# Patient Record
Sex: MALE | Race: WHITE | URBAN - METROPOLITAN AREA
[De-identification: names, ages, dates, MRNs, and addresses within clinical notes are randomized per-mention and may not be internally consistent; named-entity substitution may affect disease eponyms.]

---

## 2022-09-07 ENCOUNTER — OFFICE VISIT (OUTPATIENT)
Dept: PSYCHIATRY | Facility: CLINIC | Age: 22
End: 2022-09-07
Payer: MEDICAID

## 2022-09-07 ENCOUNTER — PATIENT MESSAGE (OUTPATIENT)
Dept: PSYCHIATRY | Facility: CLINIC | Age: 22
End: 2022-09-07
Payer: MEDICAID

## 2022-09-07 DIAGNOSIS — F90.0 ATTENTION DEFICIT HYPERACTIVITY DISORDER (ADHD), PREDOMINANTLY INATTENTIVE TYPE: Primary | ICD-10-CM

## 2022-09-07 PROCEDURE — 1159F MED LIST DOCD IN RCRD: CPT | Mod: SA,HB,CPTII,95 | Performed by: NURSE PRACTITIONER

## 2022-09-07 PROCEDURE — 90792 PR PSYCHIATRIC DIAGNOSTIC EVALUATION W/MEDICAL SERVICES: ICD-10-PCS | Mod: SA,HB,95, | Performed by: NURSE PRACTITIONER

## 2022-09-07 PROCEDURE — 90792 PSYCH DIAG EVAL W/MED SRVCS: CPT | Mod: SA,HB,95, | Performed by: NURSE PRACTITIONER

## 2022-09-07 PROCEDURE — 1159F PR MEDICATION LIST DOCUMENTED IN MEDICAL RECORD: ICD-10-PCS | Mod: SA,HB,CPTII,95 | Performed by: NURSE PRACTITIONER

## 2022-09-07 PROCEDURE — 1160F RVW MEDS BY RX/DR IN RCRD: CPT | Mod: SA,HB,CPTII,95 | Performed by: NURSE PRACTITIONER

## 2022-09-07 PROCEDURE — 1160F PR REVIEW ALL MEDS BY PRESCRIBER/CLIN PHARMACIST DOCUMENTED: ICD-10-PCS | Mod: SA,HB,CPTII,95 | Performed by: NURSE PRACTITIONER

## 2022-09-07 RX ORDER — LISDEXAMFETAMINE DIMESYLATE 40 MG/1
40 CAPSULE ORAL DAILY
Qty: 30 CAPSULE | Refills: 0 | Status: SHIPPED | OUTPATIENT
Start: 2022-09-07 | End: 2022-09-12 | Stop reason: SDUPTHER

## 2022-09-07 RX ORDER — DEXTROAMPHETAMINE SACCHARATE, AMPHETAMINE ASPARTATE, DEXTROAMPHETAMINE SULFATE AND AMPHETAMINE SULFATE 2.5; 2.5; 2.5; 2.5 MG/1; MG/1; MG/1; MG/1
10 TABLET ORAL
Qty: 30 TABLET | Refills: 0 | Status: SHIPPED | OUTPATIENT
Start: 2022-09-07 | End: 2022-09-12 | Stop reason: SDUPTHER

## 2022-09-07 NOTE — PROGRESS NOTES
Outpatient Psychiatry Initial Visit (MD/NP)    9/7/2022    The patient location is: [place]   The chief complaint leading to consultation is: ADHD    Visit type: audiovisual    Face to Face time with patient: 40 minutes  42 minutes of total time spent on the encounter, which includes face to face time and non-face to face time preparing to see the patient (eg, review of tests), Obtaining and/or reviewing separately obtained history, Documenting clinical information in the electronic or other health record, Independently interpreting results (not separately reported) and communicating results to the patient/family/caregiver, or Care coordination (not separately reported).       Each patient to whom he or she provides medical services by telemedicine is:  (1) informed of the relationship between the physician and patient and the respective role of any other health care provider with respect to management of the patient; and (2) notified that he or she may decline to receive medical services by telemedicine and may withdraw from such care at any time.    Robert Martinez, a 21 y.o. male, presenting for initial evaluation visit. Met with patient.    Reason for Encounter: self-referral. Patient complains of ADHD      History of Present Illness: ADHD  Robert Martinez checked in on time for his appointment. Presents to establish care with a new provider to continue medication management of ADHD. Diagnosed around 18 years of age- managed on Vyvanse and Adderall. He recalls sitting in class doing anything but focusing- looking around, tapping his foot.     Symptoms of ADHD:   DSM-V ADHD Criteria:     Inattentive symptoms  Often fails to give close attention to details or makes careless mistakes in schoolwork, at work, or during other activities (e.g., overlooks or misses details, work is inaccurate). No  Often has difficulty sustaining attention in tasks or play activities (e.g., has difficulty remaining focused during  lectures, conversations, or lengthy reading). Yes- cannot pay attention to take notes,   Often does not seem to listen when spoken to directly (e.g., mind seems elsewhere, even in the absence of any obvious distraction). No  Often does not follow through on instructions and fails to finish schoolwork, chores, or duties in the workplace (e.g., starts tasks but quickly loses focus and is easily sidetracked). Yes- will play on his phone when he should be studying  Often has difficulty organizing tasks and activities (e.g., difficulty managing sequential tasks; difficulty keeping materials and belongings in order; messy, disorganized work; has poor time management; fails to meet deadlines). Yes  Often avoids, dislikes, or is reluctant to engage in tasks that require sustained mental effort (e.g., schoolwork or homework; for older adolescents and adults, preparing reports, completing forms, reviewing lengthy papers). Yes  Often loses things necessary for tasks or activities (e.g., school materials, pencils, books, tools, wallets, keys, papenwork, eyeglasses, mobile telephones). No  Is often easily distracted by extraneous stimuli (for older adolescents and adults, may include unrelated thoughts). Yes  Is often forgetful in daily activities (e.g., doing chores, running errands; for older adolescents and adults, returning calls, paying bills, keeping appointments). No     Hyperactivity symptoms:  Often fidgets with or taps hands or feet or squirms in seat. Yes - tapping foot  Often leaves seat in situations when remaining seated is expected (e.g., leaves his or her place in the classroom, in the office or other workplace, or in other situations that require remaining in place). Yes- impossible to sit still for online courses  Often runs about or climbs in situations where it is inappropriate. (Note: In adolescents or adults, may be limited to feeling restless). No  Often unable to play or engage in leisure activities  quietly. Yes  Is often on the go, acting as if driven by a motor (e.g., is unable to be or uncomfortable being still for extended time, as in restaurants, meetings; may be experienced by others as being restless or difficult to keep up with). Yes  Often talks excessively. No  Often blurts out an answer before a question has been completed (e.g., completes peoples sentences; cannot wait for turn in conversation). No  Often has difficulty waiting his or her turn (e.g., while waiting in line). Yes  Often interrupts or intrudes on others (e.g., butts into conversations, games, or activities; may start using other peoples things without asking or receiving permission; for adolescents and adults, may intrude into or take over what others are doing). 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[employer]\""  Other pertinent history: None    Substance Abuse History:  Recreational drugs:  denies  Use of alcohol: occasional, social use  Use of caffeine: coffee 1 /day  Tobacco use: no  Legal consequences of chemical use: no  Patient feels he ought to cut down on drinking and/or drug use: no  Patient has been annoyed by others criticizing his drinking or drug use: no  Patient has felt bad or guilty about drinking or drug use:no  Patient has had a drink or used drugs as an eye opener first thing in the morning to steady nerves, get rid of a hangover or get the day started: no  Use of OTC medications: nothing regular    Additional historical information includes:   Seizure: denies  Head trauma/TBI:denies    The following portions of the patient's history were reviewed and updated as appropriate: allergies, current medications, past family history, past  medical history, past social history, past surgical history, and problem list.      Review Of Systems:     Medical Review Of Systems:  Constitutional: negative  Respiratory: negative  Cardiovascular: negative  Gastrointestinal: negative  Musculoskeletal:negative  Neurological: negative    Psychiatric Review Of Systems:  Sleep: yes, trouble falling asleep  Appetite changes: no  Weight changes: no  Energy: no  Interest/pleasure/anhedonia: no  Somatic symptoms: no  Libido: no  Anxiety/panic: yes  Guilty/hopeless: no  Self-injurious behavior/risky behavior: no  Any drugs: no  Alcohol: no       Current Evaluation:     Musculoskeletal  Muscle Strength/Tone:  no tremor, no tic per paitent   Gait & Station:  MARIBEL due to video visit       Relevant Elements of Neurological Exam: MARIBEL due to video visit     Nutritional Screening: Considering the patient's height and weight, medications, medical history and preferences, should a referral be made to the dietitian? no    Mental Status Evaluation:  Appearance:  unremarkable, age appropriate   Behavior:  normal, cooperative   Speech:  no latency; no press   Mood:  steady   Affect:  congruent and appropriate   Thought Process:  normal and logical   Thought Content:  normal, no suicidality, no homicidality, delusions, or paranoia   Sensorium:  grossly intact   Cognition:  grossly intact   Insight:  intact   Judgment:  behavior is adequate to circumstances     Physical/Somatic Complaints   The patient lists: no physical complaints.    Functioning in Relationships:  Spouse/partner: good  Peers: good  Employers: [employer]    Constitutional  Vitals:    There were no vitals filed for this visit.  Patient to check his BP at home and message the results     General:  unremarkable, age appropriate       Laboratory Data  No visits with results within 1 Month(s) from this visit.   Latest known visit with results is:   No results found for any previous visit.          Medications  Outpatient Encounter Medications as of 9/7/2022   Medication Sig Dispense Refill    dextroamphetamine-amphetamine 10 mg Tab Take 1 tablet (10 mg total) by mouth after lunch. 30 tablet 0    lisdexamfetamine (VYVANSE) 40 MG Cap Take 1 capsule (40 mg total) by mouth once daily. 30 capsule 0     No facility-administered encounter medications on file as of 9/7/2022.           Assessment - Diagnosis - Goals:     Impression: Robert Martinez, a 21 y.o. male, presenting for initial evaluation visit to continue medication management of ADHD symptoms. Currently stable on Vyvanse and prn Adderall.       ICD-10-CM ICD-9-CM   1. Attention deficit hyperactivity disorder (ADHD), predominantly inattentive type  F90.0 314.00       Strengths and Liabilities: Strength: Patient accepts guidance/feedback, Strength: Patient is expressive/articulate., Strength: Patient is intelligent., Liability: Patient lacks coping skills.    Treatment Goals:    ADHD: complete work/school assignments on time, stay on task without frequent reminders, less frequent interruptions of others, remain focused during conversations with others as evidenced by self-report and observation      Treatment Plan/Recommendations:   Medication Management: Continue current medications.   Vyvanse 40 mg daily  Adderall IR 10 mg after lunch  Patient has no contraindications: no h/o allergic rxn, agitation, anxiety, tourette's, arrythmia, cardiovascular disease, cardiac structural abnormalities, hyperthyroidism, glaucoma, Other psychiatric illness, etc.   Discussed diagnosis, risks and benefits of proposed treatment vs alternative treatments vs no treatment, and potential side effects of these treatments (death, dependency, psychosis, cande, aggression, HTN, ticks, MI, stroke, arrythmia, seizure, anaphylaxis or other allergic reactions, leukopenia, nervousness, anorexia, insomnia, tachycardia, palpitations, dizziness, BP changes, HR changes, visual  disturbance, etc.). The patient expresses understanding of the above and displays the capacity to agree with this treatment given said understanding. Patient also agrees that, currently, the benefits outweigh the risks and would like to pursue treatment at this time.  The risks and benefits of medication were discussed with the patient.  Labs: UDS ordered  The treatment plan and follow up plan were reviewed with the patient.  Discussed diagnosis, risks and benefits of proposed treatment above vs alternative treatments vs no treatment, and potential side effects of these treatments. The patient expresses understanding of the above and displays the capacity to agree with this treatment given said understanding. Patient also agrees that, currently, the benefits outweigh the risks and would like to pursue treatment at this time.  Discussed inherent unpredictability of medications in each individual.   Encouraged Patient to keep future appointments.   Take medications as prescribed and abstain from substance abuse.   In the event of an emergency patient was advised to go to the emergency room      Return to Clinic: 3 months    Total time: 42 minutes with more than 50% of time spent counseling and/or coordinating care.  (which included pts differential diagnosis and prognosis for psychiatric conditions, risks, benefits of treatments, instructions and adherence to treatment plan, risk reduction, reviewing current psychiatric medication regimen, medical problems and social stressors. In addtion to possible discussion with other healthcare provider/s)    Rebecca Diana  [facility]-BC PMHNP  [facility]

## 2022-09-12 DIAGNOSIS — F90.0 ATTENTION DEFICIT HYPERACTIVITY DISORDER (ADHD), PREDOMINANTLY INATTENTIVE TYPE: ICD-10-CM

## 2022-09-12 RX ORDER — LISDEXAMFETAMINE DIMESYLATE 40 MG/1
40 CAPSULE ORAL DAILY
Qty: 30 CAPSULE | Refills: 0 | Status: SHIPPED | OUTPATIENT
Start: 2022-09-12 | End: 2022-10-20 | Stop reason: SDUPTHER

## 2022-09-12 RX ORDER — DEXTROAMPHETAMINE SACCHARATE, AMPHETAMINE ASPARTATE, DEXTROAMPHETAMINE SULFATE AND AMPHETAMINE SULFATE 2.5; 2.5; 2.5; 2.5 MG/1; MG/1; MG/1; MG/1
10 TABLET ORAL
Qty: 30 TABLET | Refills: 0 | Status: SHIPPED | OUTPATIENT
Start: 2022-09-12 | End: 2022-10-20 | Stop reason: SDUPTHER

## 2022-10-19 ENCOUNTER — PATIENT MESSAGE (OUTPATIENT)
Dept: PSYCHIATRY | Facility: CLINIC | Age: 22
End: 2022-10-19
Payer: MEDICAID

## 2022-10-20 DIAGNOSIS — F90.0 ATTENTION DEFICIT HYPERACTIVITY DISORDER (ADHD), PREDOMINANTLY INATTENTIVE TYPE: Primary | ICD-10-CM

## 2022-10-20 RX ORDER — DEXTROAMPHETAMINE SACCHARATE, AMPHETAMINE ASPARTATE, DEXTROAMPHETAMINE SULFATE AND AMPHETAMINE SULFATE 2.5; 2.5; 2.5; 2.5 MG/1; MG/1; MG/1; MG/1
10 TABLET ORAL
Qty: 30 TABLET | Refills: 0 | Status: SHIPPED | OUTPATIENT
Start: 2022-10-20 | End: 2022-12-01 | Stop reason: SDUPTHER

## 2022-10-20 RX ORDER — DEXTROAMPHETAMINE SACCHARATE, AMPHETAMINE ASPARTATE, DEXTROAMPHETAMINE SULFATE AND AMPHETAMINE SULFATE 2.5; 2.5; 2.5; 2.5 MG/1; MG/1; MG/1; MG/1
10 TABLET ORAL
Qty: 30 TABLET | Refills: 0 | Status: SHIPPED | OUTPATIENT
Start: 2022-11-18 | End: 2022-12-01 | Stop reason: SDUPTHER

## 2022-10-20 RX ORDER — LISDEXAMFETAMINE DIMESYLATE 40 MG/1
40 CAPSULE ORAL DAILY
Qty: 30 CAPSULE | Refills: 0 | Status: SHIPPED | OUTPATIENT
Start: 2022-11-18 | End: 2022-12-01 | Stop reason: SDUPTHER

## 2022-10-20 RX ORDER — LISDEXAMFETAMINE DIMESYLATE 40 MG/1
40 CAPSULE ORAL DAILY
Qty: 30 CAPSULE | Refills: 0 | Status: SHIPPED | OUTPATIENT
Start: 2022-10-20 | End: 2022-12-01 | Stop reason: SDUPTHER

## 2022-12-01 ENCOUNTER — OFFICE VISIT (OUTPATIENT)
Dept: PSYCHIATRY | Facility: CLINIC | Age: 22
End: 2022-12-01
Payer: MEDICAID

## 2022-12-01 DIAGNOSIS — F90.0 ATTENTION DEFICIT HYPERACTIVITY DISORDER (ADHD), PREDOMINANTLY INATTENTIVE TYPE: Primary | ICD-10-CM

## 2022-12-01 PROCEDURE — 1160F RVW MEDS BY RX/DR IN RCRD: CPT | Mod: SA,HB,CPTII,95 | Performed by: NURSE PRACTITIONER

## 2022-12-01 PROCEDURE — 99213 PR OFFICE/OUTPT VISIT, EST, LEVL III, 20-29 MIN: ICD-10-PCS | Mod: SA,HB,95, | Performed by: NURSE PRACTITIONER

## 2022-12-01 PROCEDURE — 1159F PR MEDICATION LIST DOCUMENTED IN MEDICAL RECORD: ICD-10-PCS | Mod: SA,HB,CPTII,95 | Performed by: NURSE PRACTITIONER

## 2022-12-01 PROCEDURE — 99212 OFFICE O/P EST SF 10 MIN: CPT | Performed by: NURSE PRACTITIONER

## 2022-12-01 PROCEDURE — 99213 OFFICE O/P EST LOW 20 MIN: CPT | Mod: SA,HB,95, | Performed by: NURSE PRACTITIONER

## 2022-12-01 PROCEDURE — 1160F PR REVIEW ALL MEDS BY PRESCRIBER/CLIN PHARMACIST DOCUMENTED: ICD-10-PCS | Mod: SA,HB,CPTII,95 | Performed by: NURSE PRACTITIONER

## 2022-12-01 PROCEDURE — 1159F MED LIST DOCD IN RCRD: CPT | Mod: SA,HB,CPTII,95 | Performed by: NURSE PRACTITIONER

## 2022-12-01 RX ORDER — LISDEXAMFETAMINE DIMESYLATE 40 MG/1
40 CAPSULE ORAL DAILY
Qty: 30 CAPSULE | Refills: 0 | Status: SHIPPED | OUTPATIENT
Start: 2022-12-21

## 2022-12-01 RX ORDER — LISDEXAMFETAMINE DIMESYLATE 40 MG/1
40 CAPSULE ORAL DAILY
Qty: 30 CAPSULE | Refills: 0 | Status: SHIPPED | OUTPATIENT
Start: 2023-01-18

## 2022-12-01 RX ORDER — DEXTROAMPHETAMINE SACCHARATE, AMPHETAMINE ASPARTATE, DEXTROAMPHETAMINE SULFATE AND AMPHETAMINE SULFATE 2.5; 2.5; 2.5; 2.5 MG/1; MG/1; MG/1; MG/1
10 TABLET ORAL
Qty: 30 TABLET | Refills: 0 | Status: SHIPPED | OUTPATIENT
Start: 2022-12-21

## 2022-12-01 RX ORDER — LISDEXAMFETAMINE DIMESYLATE 40 MG/1
40 CAPSULE ORAL DAILY
Qty: 30 CAPSULE | Refills: 0 | Status: SHIPPED | OUTPATIENT
Start: 2023-02-15

## 2022-12-01 RX ORDER — DEXTROAMPHETAMINE SACCHARATE, AMPHETAMINE ASPARTATE, DEXTROAMPHETAMINE SULFATE AND AMPHETAMINE SULFATE 2.5; 2.5; 2.5; 2.5 MG/1; MG/1; MG/1; MG/1
10 TABLET ORAL
Qty: 30 TABLET | Refills: 0 | Status: SHIPPED | OUTPATIENT
Start: 2023-01-18

## 2022-12-01 RX ORDER — DEXTROAMPHETAMINE SACCHARATE, AMPHETAMINE ASPARTATE, DEXTROAMPHETAMINE SULFATE AND AMPHETAMINE SULFATE 2.5; 2.5; 2.5; 2.5 MG/1; MG/1; MG/1; MG/1
10 TABLET ORAL
Qty: 30 TABLET | Refills: 0 | Status: SHIPPED | OUTPATIENT
Start: 2023-02-15

## 2022-12-01 NOTE — PROGRESS NOTES
Outpatient Psychiatry Follow-Up Visit (MD/NP)    12/1/2022    The patient location is: [place]  The chief complaint leading to consultation is: ADHD    Visit type: audiovisual    Face to Face time with patient: 5 minutes  9 minutes of total time spent on the encounter, which includes face to face time and non-face to face time preparing to see the patient (eg, review of tests), Obtaining and/or reviewing separately obtained history, Documenting clinical information in the electronic or other health record, Independently interpreting results (not separately reported) and communicating results to the patient/family/caregiver, or Care coordination (not separately reported).     Each patient to whom he or she provides medical services by telemedicine is:  (1) informed of the relationship between the physician and patient and the respective role of any other health care provider with respect to management of the patient; and (2) notified that he or she may decline to receive medical services by telemedicine and may withdraw from such care at any time.    Clinical Status of Patient:  Outpatient (Ambulatory)    Chief Complaint:  Robert Martinez is a 22 y.o. male who presents today for follow-up of attention problems.  Met with patient.      Interval History and Content of Current Session:  Interim Events/Subjective Report/Content of Current Session:   Robert Martinez checked in on time for his virtual visit. At initial visit we continues ADHD treatment. Presents for follow-up. Denies issues with current regimen. He denies any problems with headache, stomach upset, weight loss, insomnia, chest pain, palpitations, tics, or tremors. Denies SI/HI/AVH. No objective s/sx of psychosis or cande. Patient verbalized motivation for compliance with medications and all other elements of treatment plan.           Review of Systems   PSYCHIATRIC: Pertinant items are noted in the narrative.  CONSTITUTIONAL: No weight gain or loss.    MUSCULOSKELETAL: No pain or stiffness of the joints.  RESPIRATORY: No shortness of breath.  CARDIOVASCULAR: No tachycardia or chest pain.  GASTROINTESTINAL: No nausea, vomiting, pain, constipation or diarrhea.    Past Medical, Family and Social History: The patient's past medical, family and social history have been reviewed and updated as appropriate within the electronic medical record - see encounter notes.    Compliance: yes    Side effects: None    Risk Parameters:  Patient reports no suicidal ideation  Patient reports no homicidal ideation  Patient reports no self-injurious behavior  Patient reports no violent behavior    Exam (detailed: at least 9 elements; comprehensive: all 15 elements)   Constitutional  Vitals:    There were no vitals filed for this visit.     General:  unremarkable, age appropriate     Musculoskeletal  Muscle Strength/Tone:  not examined   Gait & Station:  MARIBEL due to video visit      Psychiatric  Speech:  no latency; no press   Mood & Affect:  steady  congruent and appropriate   Thought Process:  normal and logical   Associations:  intact   Thought Content:  normal, no suicidality, no homicidality, delusions, or paranoia   Insight:  intact   Judgement: behavior is adequate to circumstances   Orientation:  grossly intact   Memory: intact for content of interview   Language: grossly intact   Attention Span & Concentration:  able to focus   Fund of Knowledge:  intact and appropriate to age and level of education     Assessment and Diagnosis   Status/Progress: Based on the examination today, the patient's problem(s) is/are well controlled.  New problems have not been presented today.   Co-morbidities, Diagnostic uncertainty, and Lack of compliance are not complicating management of the primary condition.  There are no active rule-out diagnoses for this patient at this time.     General Impression: Robert Martinez, a 22 y.o. male, presenting for follow-up visit to continue medication  management of ADHD symptoms. Currently stable on Vyvanse and prn Adderall. Presents 12/1/22- stable      ICD-10-CM ICD-9-CM   1. Attention deficit hyperactivity disorder (ADHD), predominantly inattentive type  F90.0 314.00       Intervention/Counseling/Treatment Plan   Medication Management: Continue current medications.   Vyvanse 40 mg daily  Adderall IR 10 mg after lunch  Checked [place]  and no irregularities were noted.  The risks and benefits of medication were discussed with the patient.  Discussed diagnosis, risks and benefits of proposed treatment above vs alternative treatments vs no treatment, and potential side effects of these treatments. The patient expresses understanding of the above and displays the capacity to agree with this treatment given said understanding. Patient also agrees that, currently, the benefits outweigh the risks and would like to pursue treatment at this time.  Discussed inherent unpredictability of medications in each individual.   Encouraged Patient to keep future appointments.   Take medications as prescribed and abstain from substance abuse.   In the event of an emergency patient was advised to go to the emergency room      Return to Clinic: as scheduled    Rebecca Diana DNP-BC PMHNP  [facility]

## 2024-08-15 ENCOUNTER — OFFICE VISIT (OUTPATIENT)
Dept: PSYCHIATRY | Facility: CLINIC | Age: 24
End: 2024-08-15
Payer: MEDICAID

## 2024-08-15 VITALS
SYSTOLIC BLOOD PRESSURE: 129 MMHG | DIASTOLIC BLOOD PRESSURE: 75 MMHG | BODY MASS INDEX: 26.91 KG/M2 | HEART RATE: 96 BPM | WEIGHT: 177.56 LBS | HEIGHT: 68 IN

## 2024-08-15 DIAGNOSIS — F41.9 ANXIETY DISORDER, UNSPECIFIED TYPE: ICD-10-CM

## 2024-08-15 DIAGNOSIS — F90.0 ATTENTION DEFICIT HYPERACTIVITY DISORDER (ADHD), PREDOMINANTLY INATTENTIVE TYPE: Primary | ICD-10-CM

## 2024-08-15 PROCEDURE — 99999 PR PBB SHADOW E&M-EST. PATIENT-LVL II: CPT | Mod: PBBFAC,SA,HB, | Performed by: NURSE PRACTITIONER

## 2024-08-15 PROCEDURE — 99212 OFFICE O/P EST SF 10 MIN: CPT | Mod: PBBFAC | Performed by: NURSE PRACTITIONER

## 2024-08-15 NOTE — PROGRESS NOTES
"Outpatient Psychiatry Initial Visit (MD/NP)    8/15/2024    Robert Martinez, a 23 y.o. male, presenting for initial evaluation visit. Met with patient.    Reason for Encounter: self-referral. Patient complains of   Chief Complaint   Patient presents with    ADHD    Anxiety     Chief compliant in patient's words: "so I've been prescribed Vyvanse and Adderall for a while, I was seeing a Doctor on Zoom but she left."    BRIEF SOCIAL HISTORY:    Living situation: lives in off-campus apartment  Relationships:   Academic hx: Senior year at [employer], business degree, pursuing business degree  Developmental hx: raised by both parents until parents  in 7th grade. Has 2 sisters. No early life trauma or abuse  Occupational hx:  at a [employer]. Previously worked as     HISTORY OF PRESENT ILLNESS:    ADHD    Childhood --     Initially diagnosed in 9th grade (~ 15/16 years old) via pediatric psychiatrist   Patient reports struggling with academics at that time, which led mother to pursue eval  No clear historical symptoms prior to 12 years old   - reports he was an average student  No significant behavioral issues in childhood or adolescence     Adulthood --     Patient reports average academics in bachelor's degree   - entering Senior year for Fall semester   - no accommodations for tests, etc  Patient endorses chronic difficulty sustaining attention, distractibility, and procrastination    - easily distracted by phone or computer in class   - has to re-read items multiple times on tests    - negatively affects ability to focus in the classroom and on homework  Denies significant concerns with disorganization, forgetfulness, poor time management, careless mistakes, difficulty concentrating in conversation, and wrapping up final details of projects  Endorses hyperactivity sx of restlessness and fidgetiness   - denies all other hyperactivity sx  There is no clear evidence of executive dysfunction indicative of " ADHD      Depression    The patient does not present with symptoms consistent with a depressive disorder -- negative for persistent depressed mood, markedly diminished interest in most activities, significant weight gain/loss, insomnia/hypersomnia, observable psychomotor slowing or agitation, unrelenting fatigue, hopelessness, marked feelings of worthlessness or excessive guilt, appreciable change in concentration, recurrent thoughts of death, or suicidal ideation.     PHQ-2 = 0    Anxiety    The patient denies persistent excessive worry that is difficult to control lasting for a period > 6 months. Reports some episodes where anxiety is elevated, typically anticipatory before going to class, can occur a few times a week. Feels that he can mostly control this worry, though can occasionally lead to stomach pain. No history of defined panic attacks. No social phobia. No agoraphobia.    BRITTANIE-7 = 4    Bipolar    The patient denies any history of defined manic episodes including sx of elevated mood, grandiosity, persistent irritability, decreased need for sleep, racing thoughts, excessive goal-directed behavior, flight of ideas, increased energy, or risky/impulsive/erratic behavior in the absence of substance use.     Psychosis     The patient denies any hx of psychotic symptoms including AVH, paranoia, delusions, or thought disorder    OCD     The patient does not present with symptoms consistent with OCD -- absence of intrusive obsessions and accompanying time consuming compulsions.       Trauma, abuse, and violence hx -- none    Substance use -- nicotine, zyn occasionally. ETOH 1x every 1-2 weeks. No THC in a few weeks. Denies other substance use.     Legal hx -- denies     PSYCHIATRIC HISTORY:    Psychiatric Care (current & past): yes, most recently with Rebecca Diana DNP from [id number]  History of Psychotherapy: no  Previous Psychiatric Hospitalizations: no  Previous Suicide Attempts: no  History of Violence: no  Access  "to firearms: yes, gift from his father, "in a case in my closet."    Current medications -- none    Previous medication trials -- Adderall, Vyvanse    Family MH history -- mother (ADHD), sister (ADHD), sister (anxiety)    MEDICAL HISTORY:     No chronic medical dx. No known allergies to medications. No regular use of OTC medications or herbal remedies. No hx of seizures. No hx of head injury with LOC. No cardiac hx. No thyroid hx.     PSYCHIATRIC ROS:  Complete psychiatric review of systems performed covering symptoms of depression, anxiety, cande, psychosis, PTSD, OCD, ADHD, and eating disorders performed. Pertinent findings as mentioned in the HPI; otherwise, patient answers are not diagnostic of other disorders and will continued to be assessed in further appointments.          Review Of Systems:     GENERAL:  No weight gain or loss  SKIN:  No rashes or lacerations  HEAD:  No headaches  EYES:  No exophthalmos, jaundice or blindness  EARS:  No dizziness, tinnitus or hearing loss  NOSE:  No changes in smell  MOUTH & THROAT:  No dyskinetic movements or obvious goiter  CHEST:  No shortness of breath, hyperventilation or cough  CARDIOVASCULAR:  No tachycardia or chest pain  ABDOMEN:  No nausea, vomiting, pain, constipation or diarrhea  URINARY:  No frequency, dysuria or sexual dysfunction  ENDOCRINE:  No polydipsia, polyuria  MUSCULOSKELETAL:  No pain or stiffness of the joints  NEUROLOGIC:  No weakness, sensory changes, seizures, confusion, memory loss, tremor or other abnormal movements    Current Evaluation:     Nutritional Screening: Considering the patient's height and weight, medications, medical history and preferences, should a referral be made to the dietitian? no    Constitutional  Vitals:  Most recent vital signs, dated less than 90 days prior to this appointment, were reviewed.    Vitals:    08/15/24 1307   BP: 129/75   Pulse: 96   Weight: 80.6 kg (177 lb 9.3 oz)   Height: 5' 8" (1.727 m)        General:  " unremarkable, age appropriate     Musculoskeletal  Muscle Strength/Tone:  no spasicity, no rigidity   Gait & Station:  non-ataxic     Psychiatric  Speech:  no latency; no press   Mood & Affect:  euthymic  congruent and appropriate   Thought Process:  normal and logical   Associations:  intact   Thought Content:  normal, no suicidality, no homicidality, delusions, or paranoia   Insight:  intact   Judgement: behavior is adequate to circumstances   Orientation:  grossly intact   Memory: intact for content of interview   Language: grossly intact   Attention Span & Concentration:  able to focus   Fund of Knowledge:  intact and appropriate to age and level of education       Relevant Elements of Neurological Exam: normal gait    Functioning in Relationships: see above    Laboratory Data  No visits with results within 1 Month(s) from this visit.   Latest known visit with results is:   No results found for any previous visit.         Medications  Outpatient Encounter Medications as of 8/15/2024   Medication Sig Dispense Refill    dextroamphetamine-amphetamine 10 mg Tab Take 1 tablet (10 mg total) by mouth after lunch. 30 tablet 0    dextroamphetamine-amphetamine 10 mg Tab Take 1 tablet (10 mg total) by mouth after lunch. 30 tablet 0    dextroamphetamine-amphetamine 10 mg Tab Take 1 tablet (10 mg total) by mouth after lunch. 30 tablet 0    lisdexamfetamine (VYVANSE) 40 MG Cap Take 1 capsule (40 mg total) by mouth once daily. 30 capsule 0    lisdexamfetamine (VYVANSE) 40 MG Cap Take 1 capsule (40 mg total) by mouth once daily. 30 capsule 0    lisdexamfetamine (VYVANSE) 40 MG Cap Take 1 capsule (40 mg total) by mouth once daily. 30 capsule 0     No facility-administered encounter medications on file as of 8/15/2024.           Assessment - Diagnosis - Goals:     Impression: Robert Martinez is a 23 y.o. male with a psychiatric hx of ADHD, predominantly inattentive type presenting with symptoms which are not consistent with  previous diagnosis. Will assign anxiety unspecified diagnosis, R/O BRITTANIE. Medical hx is unremarkable. Family  hx is significant for ADHD and anxiety in 1st degree relatives. Initial diagnosis of ADHD at 17 y/o, previously prescribed combination of Vyvanse and Adderall. No hx of psychiatric hospitalizations. No hx of SA, SIB, or violence. No hx of defined substance abuse. Recreational THC and ETOH use. Entering Senior year at [employer] Fall 2024.         ICD-10-CM ICD-9-CM   1. Attention deficit hyperactivity disorder (ADHD), predominantly inattentive type  F90.0 314.00   2. Anxiety disorder, unspecified type  F41.9 300.00       Strengths and Liabilities: Strength: Patient accepts guidance/feedback, Strength: Patient is expressive/articulate., Strength: Patient is intelligent., Strength: Patient is motivated for change., Strength: Patient is physically healthy., Strength: Patient has positive support network., Strength: Patient has reasonable judgment., Liability: Patient lacks coping skills.    Treatment Goals:  Specify outcomes written in observable, behavioral terms:   Elucidate diagnosis    Treatment Plan/Recommendations:   Reviewed patient's current sx, medication regimen, and psychiatric hx   Discussed diagnostic impressions and treatment recommendations  Patient informed that historical symptoms expressed today do not align with ADHD  Directed patient to obtain historical records from initial evaluation  Media tab includes mother's questionnaire in 2017 regarding patient's symptoms  Appears that initial onset of symptoms was ~ 14 years old   Patient referred to ADHD clinic given historical ADHD diagnosis  Consider psychological testing     Medication Management  Prescription drug management was employed during the encounter, as medications were prescribed, or considered but not prescribed.   [facility] reviewed  The risks and benefits of medication were discussed with the patient.  Possible expectable adverse  effects of any current or proposed individual psychotropic agents were discussed with this patient.  Counseling was provided on the importance of full compliance with any prescribed medication.  Detailed instructions were provided to the patient regarding the administration of any prescribed medication.  Patient voiced understanding    Return to Clinic: as needed    I spent a total of 56 minutes on the day of the visit. This includes face to face time and non-face to face time preparing to see the patient (eg, review of tests), obtaining and/or reviewing separately obtained history, documenting clinical information in the electronic or other health record, independently interpreting results and communicating results to the patient/family/caregiver, or care coordinator.

## 2024-08-16 ENCOUNTER — PATIENT MESSAGE (OUTPATIENT)
Dept: PSYCHIATRY | Facility: CLINIC | Age: 24
End: 2024-08-16
Payer: MEDICAID